# Patient Record
Sex: FEMALE | Race: WHITE | Employment: STUDENT | URBAN - METROPOLITAN AREA
[De-identification: names, ages, dates, MRNs, and addresses within clinical notes are randomized per-mention and may not be internally consistent; named-entity substitution may affect disease eponyms.]

---

## 2019-01-28 ENCOUNTER — OFFICE VISIT (OUTPATIENT)
Dept: URGENT CARE | Facility: CLINIC | Age: 24
End: 2019-01-28
Payer: COMMERCIAL

## 2019-01-28 VITALS
RESPIRATION RATE: 16 BRPM | TEMPERATURE: 99 F | DIASTOLIC BLOOD PRESSURE: 76 MMHG | BODY MASS INDEX: 20.62 KG/M2 | HEART RATE: 82 BPM | HEIGHT: 60 IN | WEIGHT: 105 LBS | SYSTOLIC BLOOD PRESSURE: 110 MMHG | OXYGEN SATURATION: 100 %

## 2019-01-28 DIAGNOSIS — L02.211 CUTANEOUS ABSCESS OF ABDOMINAL WALL: Primary | ICD-10-CM

## 2019-01-28 PROCEDURE — 10061 INCISION & DRAINAGE: ICD-10-PCS | Mod: S$GLB,,, | Performed by: EMERGENCY MEDICINE

## 2019-01-28 PROCEDURE — 99203 PR OFFICE/OUTPT VISIT, NEW, LEVL III, 30-44 MIN: ICD-10-PCS | Mod: 25,S$GLB,, | Performed by: EMERGENCY MEDICINE

## 2019-01-28 PROCEDURE — 10061 I&D ABSCESS COMP/MULTIPLE: CPT | Mod: S$GLB,,, | Performed by: EMERGENCY MEDICINE

## 2019-01-28 PROCEDURE — 3008F BODY MASS INDEX DOCD: CPT | Mod: CPTII,S$GLB,, | Performed by: EMERGENCY MEDICINE

## 2019-01-28 PROCEDURE — 99203 OFFICE O/P NEW LOW 30 MIN: CPT | Mod: 25,S$GLB,, | Performed by: EMERGENCY MEDICINE

## 2019-01-28 PROCEDURE — 3008F PR BODY MASS INDEX (BMI) DOCUMENTED: ICD-10-PCS | Mod: CPTII,S$GLB,, | Performed by: EMERGENCY MEDICINE

## 2019-01-28 RX ORDER — LISDEXAMFETAMINE DIMESYLATE 50 MG/1
40 CAPSULE ORAL EVERY MORNING
COMMUNITY

## 2019-01-28 RX ORDER — CLINDAMYCIN HYDROCHLORIDE 300 MG/1
300 CAPSULE ORAL EVERY 8 HOURS
Qty: 30 CAPSULE | Refills: 0 | Status: SHIPPED | OUTPATIENT
Start: 2019-01-28 | End: 2019-02-07

## 2019-01-28 RX ORDER — NORGESTIMATE AND ETHINYL ESTRADIOL 7DAYSX3 28
1 KIT ORAL
COMMUNITY
Start: 2014-10-15

## 2019-01-28 RX ORDER — CETIRIZINE HYDROCHLORIDE 10 MG/1
10 TABLET ORAL
COMMUNITY

## 2019-01-28 RX ORDER — MUPIROCIN 20 MG/G
OINTMENT TOPICAL
Qty: 22 G | Refills: 1 | Status: SHIPPED | OUTPATIENT
Start: 2019-01-28

## 2019-01-28 NOTE — PATIENT INSTRUCTIONS
Apply Bactroban ointment underneath the fingernails once daily for 5 days and in the nostrils once daily for 5 days to help with staph eradication.  Apply Bactroban ointment to the wound area 3 times a day for 7 days.  Clindamycin prescription  Take the packing out or return here for packing removal in 48-72 hours.  Wash with antibacterial soap like dial  Ibuprofen 400 mg every 6 hr for pain  Okay to also take Tylenol 650 mg every 4 hr for pain  Review abscess sheet incision and drainage sheet.  Review MRSA sheet.    Return for any concerns or problems.      Abscess (Incision & Drainage)  An abscess is sometimes called a boil. It happens when bacteria get trapped under the skin and start to grow. Pus forms inside the abscess as the body responds to the bacteria. An abscess can happen with an insect bite, ingrown hair, blocked oil gland, pimple, cyst, or puncture wound.  Your healthcare provider has drained the pus from your abscess. If the abscess pocket was large, your healthcare provider may have put in gauze packing. Your provider will need to remove it on your next visit. He or she may also replace it at that time. You may not need antibiotics to treat a simple abscess, unless the infection is spreading into the skin around the wound (cellulitis).  The wound will take about 1 to 2 weeks to heal, depending on the size of the abscess. Healthy tissue will grow from the bottom and sides of the opening until it seals over.  Home care  These tips can help your wound heal:  · The wound may drain for the first 2 days. Cover the wound with a clean dry dressing. Change the dressing if it becomes soaked with blood or pus.  · If a gauze packing was placed inside the abscess pocket, you may be told to remove it yourself. You may do this in the shower. Once the packing is removed, you should wash the area in the shower, or clean the area as directed by your provider. Continue to do this until the skin opening has closed. Make  sure you wash your hands after changing the packing or cleaning the wound.  · If you were prescribed antibiotics, take them as directed until they are all gone.  · You may use acetaminophen or ibuprofen to control pain, unless another pain medicine was prescribed. If you have liver disease or ever had a stomach ulcer, talk with your doctor before using these medicines.  Follow-up care  Follow up with your healthcare provider, or as advised. If a gauze packing was put in your wound, it should be removed in 1 to 2 days. Check your wound every day for any signs that the infection is getting worse. The signs are listed below.  When to seek medical advice  Call your healthcare provider right away if any of these occur:  · Increasing redness or swelling  · Red streaks in the skin leading away from the wound  · Increasing local pain or swelling  · Continued pus draining from the wound 2 days after treatment  · Fever of 100.4ºF (38ºC) or higher, or as directed by your healthcare provider  · Boil returns when you are at home  Date Last Reviewed: 9/1/2016 © 2000-2017 [employer]. [address] [place] [address]. All rights reserved. This information is not intended as a substitute for professional medical care. Always follow your healthcare professional's instructions.        Wound Care After Packing Removal or Replacement  Packing is a special type of dressing placed inside a wound to help it heal. Once the packing is removed, you need to care for your wound. Good wound care helps prevent infection. Be sure to keep all follow-up appointments with your healthcare provider. Follow these instructions to take care of the wound once youre at home.  Home care  Your healthcare provider may prescribe medicines for pain. Or he or she may suggest an over-the-counter (OTC) pain reliever, such as ibuprofen or acetaminophen. Talk with your healthcare provider before taking any OTC medicines if you have chronic  liver or kidney disease, a stomach ulcer, or gastrointestinal bleeding. In certain cases, you may also need to take antibiotics to help prevent infection. If so, take them exactly as directed for as long as directed. Dont stop taking your antibiotics until they are all gone, even if you feel better.  Here are some general care guidelines for your wound:  · Follow your healthcare providers instructions on how to care for your wound. Always wash your hands with soap and warm water before and after tending to your wound.  · If a bandage was put on, remove and change it once a day or as directed. If the bandage gets wet or dirty, replace it as soon as possible with a new bandage. Use a clean cloth to gently pat the wound dry.  · If your packing was replaced, a small piece of gauze may hang from the wound. It allows fluid to continue draining from the wound. You may need to use an ointment or cream to keep the packing from sticking to the bandage.  · Don't bathe in a tub or soak your wound until your healthcare provider says its OK. Take showers or sponge baths instead. Avoid swimming.  · Dont scratch, rub, scrub, or pick your wound.  · Check your wound daily for the signs of infection listed below.  If your wound was closed, it was likely with one of four types of closures. These include stitches (sutures), strips of surgical tape, skin glue, and staples. Your healthcare provider will decide on the best closure based on the size and location of your wound. Each type of closure needs specific care.  · Sutures. You may want to clean the wound daily after the first 2 to 3 days. To do this, remove the bandage and gently wash the area with soap and warm water. After cleaning, apply a thin layer of antibiotic ointment if recommended. Then put on a new bandage. Sutures on the outside of the skin usually need to be removed by your healthcare provider.  · Surgical tape. Keep the area dry. If it gets wet, blot it dry with a  towel. Surgical tape closures usually fall off within 7 to 10 days. If they have not fallen off after 10 days, you can remove them yourself. To remove the tape, use mineral oil or petroleum jelly on a cotton ball to gently rub the adhesive.  · Skin glue. You may shower or bathe as usual. But do not use soaps, lotions, or ointments on the wound area. Do not scrub the wound. After bathing, pat the wound dry with a soft towel. Do not apply liquids like peroxide, ointments, or creams to the wound while the strips or film is in place. Don't scratch, rub, or pick at the strips or film. Don't put tape directly over the strips or film. Skin adhesive film will fall off naturally in 5 to 10 days. If it does not peel off in 10 days, gently rub petroleum jelly or an ointment onto the film.  · Staples. Take showers or sponge baths. Don't take tub baths. Don't use lotions on the wound area. The area may be cleaned with soap and water 2 to 3 days after the wound was stapled. Don't scrub the wound. Pat it dry with a clean soft cloth or towel. You can use antibiotic ointment if your healthcare provider tells you to. Staples will need to be removed in 10 to 14 days.  Follow-up care  Follow up with your healthcare provider, or as directed. If your packing was replaced, you may need another visit within 1 to 3 days to remove or replace it. If you have sutures or staples, return for their removal as directed.  When to seek medical advice  Call your health care provider right away if you have signs of infection:  · Fever of 1 degree or more above your normal temperature, or as directed by your healthcare provider  · Increasing pain in the wound or pain that doesnt get better even with pain medicine  · Increasing redness or swelling  · Pus or bad-smelling drainage from the wound  Also call your healthcare provider right away if any of these occur:  · Your wound bleeds more than a small amount or wont stop bleeding.  · The edges of your  wound come apart.  · You have numbness or weakness in the wound area that doesnt go away.  Date Last Reviewed: 10/2/2015  © 2557-4832 [employer]. [address] [place] [address], [place] [address]. All rights reserved. This information is not intended as a substitute for professional medical care. Always follow your healthcare professional's instructions.        Methicillin-Resistant Staphylococcus aureus (MRSA)  What is MRSA?  Staphylococcus aureus bacteria or staph are common germs. They are normally found on the skin or in the nose of many people. Usually, the bacteria cause no problem. Occasionally, they can cause mild skin infections. Or severe infections of the skin, lungs, blood, or other organs or tissues may develop. Some staph infections can be easily treated with antibiotics. But one type of staph infection, methicillin-resistant staphylococcus aureus (MRSA) cannot. It is called methicillin-resistant because the antibiotic, methicillin, which used to be effective treatment, no longer works. MRSA is common in hospitals and nursing homes or long-term care facilities. It is also spreading among healthy children and adults outside the healthcare system. A person may be a carrier. Or he or she may have the infection.  · Colonization. When a person carries the MRSA bacteria but is healthy, it's called being colonized. This person can spread MRSA to others but has no infection.  · Infection. When a person gets sick because of the bacteria, it's called being infected with MRSA. This person can also spread MRSA to others. If not treated properly, MRSA infections can be very serious and even cause death.    What are the risk factors for MRSA?  Anyone can get MRSA, although there are factors that increase the risk. Some of these include:  · Recent or lengthy hospital stay  · Having a surgical wound or intravenous (IV) line  · Having a weakened immune system due to a medical condition or its  treatment  · Living in a nursing home or long-term care facility  · Recent antibiotic therapy  · Diabetes  · Kidney dialysis  · HIV infection  · Injection drug use or sharing needles  · penitentiary or [facility] time  · Living in any crowded facility, such as a dormitory  ·  service  · Sharing sports equipment, razors, or other sharp objects  How does MRSA spread?  · People who are colonized with MRSA have MRSA in their noses or on their skin. Though they may not be sick themselves, they can spread the germs to others.  · In hospitals and long-term care facilities, MRSA can spread from patient to patient on the hands of healthcare workers. It can also spread on objects, such as cart or door handles and bedrails.  · Outside healthcare settings, MRSA usually spreads through skin-to-skin contact, shared towels or athletic equipment, or through close contact with an infected person.  What are the symptoms of MRSA infection?  MRSA skin infections start as small red bumps on the skin that look like pimples or spider bites. The small bumps usually get larger and become swollen, painful, warm to the touch, and filled with pus. Fever may be present. MRSA can also start in other ways. And it can spread deeper into the body where it can cause one or more of the following:  · Infections in bones, muscles, and other tissues  · Pneumonia, an infection in one or both lungs  · Infection of a surgical wound  · Infection in the bloodstream (bacteremia)  · Infection of the lining of the heart (endocarditis)  · Infection of the urinary tract (bladder and kidneys)  How is MRSA diagnosed?  A sample of blood, urine, or infected tissue may be taken to diagnose a MRSA infection. A swab of the inside of the nose is taken to diagnose colonization. The sample is then sent to a laboratory and tested for MRSA. if the infection involves bone, joint, or other organs, a blood test may be done. Imaging studies, such as an X-ray or CT scan, may also be  needed.  How is MRSA treated?  MRSA infections are usually treated with antibiotics. It may be given by mouth in pill form or into a vein (intravenous or IV). If a skin abscess is present, it may be drained.   Patients who test positive for MRSA colonization may undergo a process called decolonization. A topical antibiotic is applied inside the nose or in the nostrils to kill the bacteria. A special soap may be used to cleanse the skin.  Can MRSA be prevented?  Hospitals and nursing homes help prevent MRSA by doing the following:  · Handwashing. This is the single most important way to prevent the spread of germs. Healthcare workers should wash their hands with soap and water or an alcohol-based hand  before and after treating each patient. They also should clean their hands after touching any surface that may be contaminated.  · Protective clothing. Healthcare workers and visitors may wear gloves and a gown when entering the room of a patient with MRSA. They remove these items before leaving.  · Private rooms. Patients with MRSA infections are placed in private rooms or in a room with others who have the same infection.  · Personal care items. Patients with MRSA may have their own patient care items, such as thermometers and stethoscopes.  · Monitoring. Hospitals monitor the spread of MRSA and educate all staff on the best ways to prevent it.  Patients can help prevent MRSA by doing the following:  · Ask all hospital staff to wash their hands before touching you. Dont be afraid to speak up!  · Wash your own hands frequently with soap and water. Or use an alcohol-based hand gel.  · Ask that stethoscopes and other instruments be wiped with alcohol before they are used on you.  · Be sure youre tested for MRSA if you have a skin infection.  If you are taking care of someone with MRSA:  · Wash your hands well with soap and water before and after any contact with the person.  · Wear gloves when changing a  bandage or touching an infected wound. Discard gloves after each use. Then wash your hands well.  · Wash the patient's bed linens, towels, and clothing in hot water with detergent or liquid bleach.  Everyone can help prevent MRSA by doing the following:  · Wash your hands often with warm water and soap.  ¨ Rub your hands together.  ¨ Clean the whole hand, under your nails, between your fingers, and up the wrists.  ¨ Wash for at least 15 to 20 seconds.  ¨ Rinse, letting the water run down your fingers, not up your wrists.  ¨ Dry your hands well. Use a paper towel or turn off the faucet and open the door.  · If soap and water aren't available, use an alcohol-based hand .  ¨ Squeeze about a tablespoon of  into the palm of one hand.  ¨ Rub your hands together briskly, cleaning the backs of your hands, the palms, between your fingers, and up the wrists.  ¨ Rub until the  is gone and your hands are completely dry.  · Keep cuts and scrapes clean and covered until they heal.  · Avoid contact with the wounds or bandages of others.  · Avoid sharing towels, razors, clothing, and athletic equipment.  Date Last Reviewed: 10/1/2016  © 8154-1618 [employer]. [address] [place] [address], [place] [address]. All rights reserved. This information is not intended as a substitute for professional medical care. Always follow your healthcare professional's instructions.

## 2019-01-28 NOTE — PROGRESS NOTES
Subjective:       Patient ID: William Haywood is a 24 y.o. female.    Vitals:    01/28/19 1534   BP: 110/76   Pulse: 82   Resp: 16   Temp: 98.6 °F (37 °C)   TempSrc: Oral   SpO2: 100%   Weight: 47.6 kg (105 lb)   Height: 5' (1.524 m)       Chief Complaint: Abscess (left lower abdomen)    Patient reports painful abscess on lower left abdomen.  Patient denies fever  Patient reports a small pimple of the left lower abdominal wall near the waistline that has become increasingly more painful and more red and larger over the last 48 hr.  She does have a history of an abscess in the left axillary region many years ago that needed incision and drainage.  She is afraid that this may have to be done again today.  No fevers no chills no spreading redness except for the isolated area of the lower left abdominal wall near the waistline.  No other complaints of systemic illness like chills or weakness or nausea.      Abscess   Chronicity:  NewProgression Since Onset: rapidly worsening  Abscess location: left lower abdomen.  Associated Symptoms: no fever, no chills  Characteristics: painful    Pain Scale:  6/10  Treatments Tried:  Warm compresses (neosporin;ibuprofen)  Relieved by:  Nothing  Worsened by:  Nothing    Review of Systems   Constitution: Negative for chills and fever.   HENT: Negative for sore throat.    Eyes: Negative for blurred vision.   Cardiovascular: Negative for chest pain.   Respiratory: Negative for shortness of breath.    Skin: Negative for rash.   Musculoskeletal: Negative for back pain and joint pain.   Gastrointestinal: Negative for abdominal pain, diarrhea, nausea and vomiting.   Neurological: Negative for headaches.   Psychiatric/Behavioral: The patient is not nervous/anxious.        Objective:      Physical Exam   Constitutional: She is oriented to person, place, and time. She appears well-developed and well-nourished.   HENT:   Head: Normocephalic and atraumatic. Head is without abrasion, without  "contusion and without laceration.   Eyes: Conjunctivae, EOM and lids are normal. Pupils are equal, round, and reactive to light.   Neck: Trachea normal, full passive range of motion without pain and phonation normal. Neck supple.   Cardiovascular: Normal rate, regular rhythm and normal heart sounds.   Pulmonary/Chest: Effort normal and breath sounds normal. No stridor. No respiratory distress.   Musculoskeletal: Normal range of motion.   Neurological: She is alert and oriented to person, place, and time.   Skin: Skin is warm, dry and intact. Capillary refill takes less than 2 seconds. No abrasion, no bruising, no burn, no ecchymosis, no laceration, no lesion and no rash noted. There is erythema.   About 2 cm superior to the left waist line of the left lower abdominal wall there is a 3 x 3 cm area of round erythema with a central fluctuant area there is no pustular head however certainly a more central area of fluctuance.  It is consistent with an abscess.  Incision and drainage successful and packed.  See procedure note   Psychiatric: She has a normal mood and affect. Her speech is normal and behavior is normal. Judgment and thought content normal. Cognition and memory are normal.   Nursing note and vitals reviewed.        Incision & Drainage  Date/Time: 1/28/2019 4:40 PM  Performed by: Osvaldo Santana MD  Authorized by: Osvaldo Santana MD     Time out: Immediately prior to procedure a "time out" was called to verify the correct patient, procedure, equipment, support staff and site/side marked as required.    Consent Done?:  Yes (Verbal)    Type:  Abscess  Body area:  Trunk  Location details:  Abdomen  Local anesthetic: lidocaine 2% without epinephrine  Anesthetic total (ml):  10  Scalpel size:  11  Incision type:  Single straight  Complexity:  Complex  Drainage:  Pus  Drainage amount:  Moderate  Wound treatment:  Incision, drainage and wound packed  Packing material:  1/4 in iodoform gauze  Patient " tolerance:  Patient tolerated the procedure well with no immediate complications    COVERED WITH BACTROBAN OINTMENT  COVERED WITH GAUZE  NO COMPLICATIONS  WILL HAVE PACKING OUT IN 48 HOURS  GIVEN INSTRUCTIONS OF WHEN TO RETURN      Assessment:       1. Cutaneous abscess of abdominal wall        Plan:       William was seen today for abscess.    Diagnoses and all orders for this visit:    Cutaneous abscess of abdominal wall    Other orders  -     clindamycin (CLEOCIN) 300 MG capsule; Take 1 capsule (300 mg total) by mouth every 8 (eight) hours. for 10 days  -     mupirocin (BACTROBAN) 2 % ointment; Apply to affected area 3 times daily      Patient Instructions   Apply Bactroban ointment underneath the fingernails once daily for 5 days and in the nostrils once daily for 5 days to help with staph eradication.  Apply Bactroban ointment to the wound area 3 times a day for 7 days.  Clindamycin prescription  Take the packing out or return here for packing removal in 48-72 hours.  Wash with antibacterial soap like dial  Ibuprofen 400 mg every 6 hr for pain  Okay to also take Tylenol 650 mg every 4 hr for pain  Review abscess sheet incision and drainage sheet.  Review MRSA sheet.    Return for any concerns or problems.      Abscess (Incision & Drainage)  An abscess is sometimes called a boil. It happens when bacteria get trapped under the skin and start to grow. Pus forms inside the abscess as the body responds to the bacteria. An abscess can happen with an insect bite, ingrown hair, blocked oil gland, pimple, cyst, or puncture wound.  Your healthcare provider has drained the pus from your abscess. If the abscess pocket was large, your healthcare provider may have put in gauze packing. Your provider will need to remove it on your next visit. He or she may also replace it at that time. You may not need antibiotics to treat a simple abscess, unless the infection is spreading into the skin around the wound (cellulitis).  The  wound will take about 1 to 2 weeks to heal, depending on the size of the abscess. Healthy tissue will grow from the bottom and sides of the opening until it seals over.  Home care  These tips can help your wound heal:  · The wound may drain for the first 2 days. Cover the wound with a clean dry dressing. Change the dressing if it becomes soaked with blood or pus.  · If a gauze packing was placed inside the abscess pocket, you may be told to remove it yourself. You may do this in the shower. Once the packing is removed, you should wash the area in the shower, or clean the area as directed by your provider. Continue to do this until the skin opening has closed. Make sure you wash your hands after changing the packing or cleaning the wound.  · If you were prescribed antibiotics, take them as directed until they are all gone.  · You may use acetaminophen or ibuprofen to control pain, unless another pain medicine was prescribed. If you have liver disease or ever had a stomach ulcer, talk with your doctor before using these medicines.  Follow-up care  Follow up with your healthcare provider, or as advised. If a gauze packing was put in your wound, it should be removed in 1 to 2 days. Check your wound every day for any signs that the infection is getting worse. The signs are listed below.  When to seek medical advice  Call your healthcare provider right away if any of these occur:  · Increasing redness or swelling  · Red streaks in the skin leading away from the wound  · Increasing local pain or swelling  · Continued pus draining from the wound 2 days after treatment  · Fever of 100.4ºF (38ºC) or higher, or as directed by your healthcare provider  · Boil returns when you are at home  Date Last Reviewed: 9/1/2016  © 2325-4531 [employer]. [address] [place] [address], [place] [address]. All rights reserved. This information is not intended as a substitute for professional medical care. Always follow your healthcare  professional's instructions.        Wound Care After Packing Removal or Replacement  Packing is a special type of dressing placed inside a wound to help it heal. Once the packing is removed, you need to care for your wound. Good wound care helps prevent infection. Be sure to keep all follow-up appointments with your healthcare provider. Follow these instructions to take care of the wound once youre at home.  Home care  Your healthcare provider may prescribe medicines for pain. Or he or she may suggest an over-the-counter (OTC) pain reliever, such as ibuprofen or acetaminophen. Talk with your healthcare provider before taking any OTC medicines if you have chronic liver or kidney disease, a stomach ulcer, or gastrointestinal bleeding. In certain cases, you may also need to take antibiotics to help prevent infection. If so, take them exactly as directed for as long as directed. Dont stop taking your antibiotics until they are all gone, even if you feel better.  Here are some general care guidelines for your wound:  · Follow your healthcare providers instructions on how to care for your wound. Always wash your hands with soap and warm water before and after tending to your wound.  · If a bandage was put on, remove and change it once a day or as directed. If the bandage gets wet or dirty, replace it as soon as possible with a new bandage. Use a clean cloth to gently pat the wound dry.  · If your packing was replaced, a small piece of gauze may hang from the wound. It allows fluid to continue draining from the wound. You may need to use an ointment or cream to keep the packing from sticking to the bandage.  · Don't bathe in a tub or soak your wound until your healthcare provider says its OK. Take showers or sponge baths instead. Avoid swimming.  · Dont scratch, rub, scrub, or pick your wound.  · Check your wound daily for the signs of infection listed below.  If your wound was closed, it was likely with one of four  types of closures. These include stitches (sutures), strips of surgical tape, skin glue, and staples. Your healthcare provider will decide on the best closure based on the size and location of your wound. Each type of closure needs specific care.  · Sutures. You may want to clean the wound daily after the first 2 to 3 days. To do this, remove the bandage and gently wash the area with soap and warm water. After cleaning, apply a thin layer of antibiotic ointment if recommended. Then put on a new bandage. Sutures on the outside of the skin usually need to be removed by your healthcare provider.  · Surgical tape. Keep the area dry. If it gets wet, blot it dry with a towel. Surgical tape closures usually fall off within 7 to 10 days. If they have not fallen off after 10 days, you can remove them yourself. To remove the tape, use mineral oil or petroleum jelly on a cotton ball to gently rub the adhesive.  · Skin glue. You may shower or bathe as usual. But do not use soaps, lotions, or ointments on the wound area. Do not scrub the wound. After bathing, pat the wound dry with a soft towel. Do not apply liquids like peroxide, ointments, or creams to the wound while the strips or film is in place. Don't scratch, rub, or pick at the strips or film. Don't put tape directly over the strips or film. Skin adhesive film will fall off naturally in 5 to 10 days. If it does not peel off in 10 days, gently rub petroleum jelly or an ointment onto the film.  · Staples. Take showers or sponge baths. Don't take tub baths. Don't use lotions on the wound area. The area may be cleaned with soap and water 2 to 3 days after the wound was stapled. Don't scrub the wound. Pat it dry with a clean soft cloth or towel. You can use antibiotic ointment if your healthcare provider tells you to. Staples will need to be removed in 10 to 14 days.  Follow-up care  Follow up with your healthcare provider, or as directed. If your packing was replaced, you  may need another visit within 1 to 3 days to remove or replace it. If you have sutures or staples, return for their removal as directed.  When to seek medical advice  Call your health care provider right away if you have signs of infection:  · Fever of 1 degree or more above your normal temperature, or as directed by your healthcare provider  · Increasing pain in the wound or pain that doesnt get better even with pain medicine  · Increasing redness or swelling  · Pus or bad-smelling drainage from the wound  Also call your healthcare provider right away if any of these occur:  · Your wound bleeds more than a small amount or wont stop bleeding.  · The edges of your wound come apart.  · You have numbness or weakness in the wound area that doesnt go away.  Date Last Reviewed: 10/2/2015  © 2855-6901 [employer]. [address] [place] [address]. All rights reserved. This information is not intended as a substitute for professional medical care. Always follow your healthcare professional's instructions.        Methicillin-Resistant Staphylococcus aureus (MRSA)  What is MRSA?  Staphylococcus aureus bacteria or staph are common germs. They are normally found on the skin or in the nose of many people. Usually, the bacteria cause no problem. Occasionally, they can cause mild skin infections. Or severe infections of the skin, lungs, blood, or other organs or tissues may develop. Some staph infections can be easily treated with antibiotics. But one type of staph infection, methicillin-resistant staphylococcus aureus (MRSA) cannot. It is called methicillin-resistant because the antibiotic, methicillin, which used to be effective treatment, no longer works. MRSA is common in hospitals and nursing homes or long-term care facilities. It is also spreading among healthy children and adults outside the healthcare system. A person may be a carrier. Or he or she may have the infection.  · Colonization. When a  person carries the MRSA bacteria but is healthy, it's called being colonized. This person can spread MRSA to others but has no infection.  · Infection. When a person gets sick because of the bacteria, it's called being infected with MRSA. This person can also spread MRSA to others. If not treated properly, MRSA infections can be very serious and even cause death.    What are the risk factors for MRSA?  Anyone can get MRSA, although there are factors that increase the risk. Some of these include:  · Recent or lengthy hospital stay  · Having a surgical wound or intravenous (IV) line  · Having a weakened immune system due to a medical condition or its treatment  · Living in a nursing home or long-term care facility  · Recent antibiotic therapy  · Diabetes  · Kidney dialysis  · HIV infection  · Injection drug use or sharing needles  · assisted or [facility] time  · Living in any crowded facility, such as a dormitory  ·  service  · Sharing sports equipment, razors, or other sharp objects  How does MRSA spread?  · People who are colonized with MRSA have MRSA in their noses or on their skin. Though they may not be sick themselves, they can spread the germs to others.  · In hospitals and long-term care facilities, MRSA can spread from patient to patient on the hands of healthcare workers. It can also spread on objects, such as cart or door handles and bedrails.  · Outside healthcare settings, MRSA usually spreads through skin-to-skin contact, shared towels or athletic equipment, or through close contact with an infected person.  What are the symptoms of MRSA infection?  MRSA skin infections start as small red bumps on the skin that look like pimples or spider bites. The small bumps usually get larger and become swollen, painful, warm to the touch, and filled with pus. Fever may be present. MRSA can also start in other ways. And it can spread deeper into the body where it can cause one or more of the following:  · Infections  in bones, muscles, and other tissues  · Pneumonia, an infection in one or both lungs  · Infection of a surgical wound  · Infection in the bloodstream (bacteremia)  · Infection of the lining of the heart (endocarditis)  · Infection of the urinary tract (bladder and kidneys)  How is MRSA diagnosed?  A sample of blood, urine, or infected tissue may be taken to diagnose a MRSA infection. A swab of the inside of the nose is taken to diagnose colonization. The sample is then sent to a laboratory and tested for MRSA. if the infection involves bone, joint, or other organs, a blood test may be done. Imaging studies, such as an X-ray or CT scan, may also be needed.  How is MRSA treated?  MRSA infections are usually treated with antibiotics. It may be given by mouth in pill form or into a vein (intravenous or IV). If a skin abscess is present, it may be drained.   Patients who test positive for MRSA colonization may undergo a process called decolonization. A topical antibiotic is applied inside the nose or in the nostrils to kill the bacteria. A special soap may be used to cleanse the skin.  Can MRSA be prevented?  Hospitals and nursing homes help prevent MRSA by doing the following:  · Handwashing. This is the single most important way to prevent the spread of germs. Healthcare workers should wash their hands with soap and water or an alcohol-based hand  before and after treating each patient. They also should clean their hands after touching any surface that may be contaminated.  · Protective clothing. Healthcare workers and visitors may wear gloves and a gown when entering the room of a patient with MRSA. They remove these items before leaving.  · Private rooms. Patients with MRSA infections are placed in private rooms or in a room with others who have the same infection.  · Personal care items. Patients with MRSA may have their own patient care items, such as thermometers and stethoscopes.  · Monitoring. Hospitals  monitor the spread of MRSA and educate all staff on the best ways to prevent it.  Patients can help prevent MRSA by doing the following:  · Ask all hospital staff to wash their hands before touching you. Dont be afraid to speak up!  · Wash your own hands frequently with soap and water. Or use an alcohol-based hand gel.  · Ask that stethoscopes and other instruments be wiped with alcohol before they are used on you.  · Be sure youre tested for MRSA if you have a skin infection.  If you are taking care of someone with MRSA:  · Wash your hands well with soap and water before and after any contact with the person.  · Wear gloves when changing a bandage or touching an infected wound. Discard gloves after each use. Then wash your hands well.  · Wash the patient's bed linens, towels, and clothing in hot water with detergent or liquid bleach.  Everyone can help prevent MRSA by doing the following:  · Wash your hands often with warm water and soap.  ¨ Rub your hands together.  ¨ Clean the whole hand, under your nails, between your fingers, and up the wrists.  ¨ Wash for at least 15 to 20 seconds.  ¨ Rinse, letting the water run down your fingers, not up your wrists.  ¨ Dry your hands well. Use a paper towel or turn off the faucet and open the door.  · If soap and water aren't available, use an alcohol-based hand .  ¨ Squeeze about a tablespoon of  into the palm of one hand.  ¨ Rub your hands together briskly, cleaning the backs of your hands, the palms, between your fingers, and up the wrists.  ¨ Rub until the  is gone and your hands are completely dry.  · Keep cuts and scrapes clean and covered until they heal.  · Avoid contact with the wounds or bandages of others.  · Avoid sharing towels, razors, clothing, and athletic equipment.  Date Last Reviewed: 10/1/2016  © 4179-7726 [employer]. [address] [place] [address], [place] [address]. All rights reserved. This information is not intended as a  substitute for professional medical care. Always follow your healthcare professional's instructions.